# Patient Record
Sex: MALE | Race: WHITE
[De-identification: names, ages, dates, MRNs, and addresses within clinical notes are randomized per-mention and may not be internally consistent; named-entity substitution may affect disease eponyms.]

---

## 2019-11-05 LAB
ALBUMIN SERPL-MCNC: 3.9 G/DL (ref 3.5–5)
ALBUMIN/GLOB SERPL: 1.1 {RATIO} (ref 0.8–2)
ALP SERPL-CCNC: 54 IU/L (ref 40–150)
ALT SERPL-CCNC: 24 IU/L (ref 0–55)
ANION GAP SERPL CALC-SCNC: 14.8 MMOL/L (ref 8–16)
BASOPHILS # BLD AUTO: 0.1 10*3/UL (ref 0–0.1)
BASOPHILS NFR BLD AUTO: 0.7 % (ref 0–1)
BUN SERPL-MCNC: 12 MG/DL (ref 7–26)
BUN/CREAT SERPL: 13 (ref 6–25)
CALCIUM SERPL-MCNC: 10.1 MG/DL (ref 8.4–10.2)
CHLORIDE SERPL-SCNC: 100 MMOL/L (ref 98–107)
CO2 SERPL-SCNC: 24 MMOL/L (ref 22–29)
DEPRECATED NEUTROPHILS # BLD AUTO: 5.8 10*3/UL (ref 2.1–6.9)
EGFRCR SERPLBLD CKD-EPI 2021: > 60 ML/MIN (ref 60–?)
EOSINOPHIL # BLD AUTO: 0.1 10*3/UL (ref 0–0.4)
EOSINOPHIL NFR BLD AUTO: 1.5 % (ref 0–6)
ERYTHROCYTE [DISTWIDTH] IN CORD BLOOD: 13.5 % (ref 11.7–14.4)
GLOBULIN PLAS-MCNC: 3.5 G/DL (ref 2.3–3.5)
GLUCOSE SERPLBLD-MCNC: 125 MG/DL (ref 74–118)
HCT VFR BLD AUTO: 41.1 % (ref 38.2–49.6)
HGB BLD-MCNC: 13.7 G/DL (ref 14–18)
LYMPHOCYTES # BLD: 2.7 10*3/UL (ref 1–3.2)
LYMPHOCYTES NFR BLD AUTO: 28 % (ref 18–39.1)
MCH RBC QN AUTO: 29.9 PG (ref 28–32)
MCHC RBC AUTO-ENTMCNC: 33.3 G/DL (ref 31–35)
MCV RBC AUTO: 89.7 FL (ref 81–99)
MONOCYTES # BLD AUTO: 0.8 10*3/UL (ref 0.2–0.8)
MONOCYTES NFR BLD AUTO: 8 % (ref 4.4–11.3)
NEUTS SEG NFR BLD AUTO: 61.4 % (ref 38.7–80)
PLATELET # BLD AUTO: 258 X10E3/UL (ref 140–360)
POTASSIUM SERPL-SCNC: 3.8 MMOL/L (ref 3.5–5.1)
RBC # BLD AUTO: 4.58 X10E6/UL (ref 4.3–5.7)
SODIUM SERPL-SCNC: 135 MMOL/L (ref 136–145)

## 2019-11-07 ENCOUNTER — HOSPITAL ENCOUNTER (OUTPATIENT)
Dept: HOSPITAL 88 - CATH LAB | Age: 69
Discharge: HOME | End: 2019-11-07
Attending: INTERNAL MEDICINE
Payer: MEDICARE

## 2019-11-07 VITALS — SYSTOLIC BLOOD PRESSURE: 143 MMHG | DIASTOLIC BLOOD PRESSURE: 77 MMHG

## 2019-11-07 VITALS — DIASTOLIC BLOOD PRESSURE: 73 MMHG | SYSTOLIC BLOOD PRESSURE: 143 MMHG

## 2019-11-07 VITALS — DIASTOLIC BLOOD PRESSURE: 80 MMHG | SYSTOLIC BLOOD PRESSURE: 146 MMHG

## 2019-11-07 VITALS — SYSTOLIC BLOOD PRESSURE: 162 MMHG | DIASTOLIC BLOOD PRESSURE: 77 MMHG

## 2019-11-07 VITALS — SYSTOLIC BLOOD PRESSURE: 145 MMHG | DIASTOLIC BLOOD PRESSURE: 82 MMHG

## 2019-11-07 VITALS — SYSTOLIC BLOOD PRESSURE: 138 MMHG | DIASTOLIC BLOOD PRESSURE: 78 MMHG

## 2019-11-07 VITALS — DIASTOLIC BLOOD PRESSURE: 77 MMHG | SYSTOLIC BLOOD PRESSURE: 154 MMHG

## 2019-11-07 VITALS — DIASTOLIC BLOOD PRESSURE: 77 MMHG | SYSTOLIC BLOOD PRESSURE: 155 MMHG

## 2019-11-07 VITALS — HEIGHT: 77 IN | BODY MASS INDEX: 24.21 KG/M2 | WEIGHT: 205 LBS

## 2019-11-07 VITALS — SYSTOLIC BLOOD PRESSURE: 142 MMHG | DIASTOLIC BLOOD PRESSURE: 77 MMHG

## 2019-11-07 VITALS — SYSTOLIC BLOOD PRESSURE: 142 MMHG | DIASTOLIC BLOOD PRESSURE: 88 MMHG

## 2019-11-07 DIAGNOSIS — E78.5: ICD-10-CM

## 2019-11-07 DIAGNOSIS — Z79.02: ICD-10-CM

## 2019-11-07 DIAGNOSIS — I25.10: Primary | ICD-10-CM

## 2019-11-07 DIAGNOSIS — Z79.84: ICD-10-CM

## 2019-11-07 DIAGNOSIS — Z82.49: ICD-10-CM

## 2019-11-07 DIAGNOSIS — Z01.812: ICD-10-CM

## 2019-11-07 DIAGNOSIS — Z72.0: ICD-10-CM

## 2019-11-07 DIAGNOSIS — I10: ICD-10-CM

## 2019-11-07 DIAGNOSIS — Z95.5: ICD-10-CM

## 2019-11-07 DIAGNOSIS — Z91.041: ICD-10-CM

## 2019-11-07 DIAGNOSIS — E13.69: ICD-10-CM

## 2019-11-07 DIAGNOSIS — Z79.82: ICD-10-CM

## 2019-11-07 DIAGNOSIS — I70.213: ICD-10-CM

## 2019-11-07 DIAGNOSIS — R94.39: ICD-10-CM

## 2019-11-07 PROCEDURE — 99153 MOD SED SAME PHYS/QHP EA: CPT

## 2019-11-07 PROCEDURE — 75716 ARTERY X-RAYS ARMS/LEGS: CPT

## 2019-11-07 PROCEDURE — 75625 CONTRAST EXAM ABDOMINL AORTA: CPT

## 2019-11-07 PROCEDURE — 80053 COMPREHEN METABOLIC PANEL: CPT

## 2019-11-07 PROCEDURE — 99152 MOD SED SAME PHYS/QHP 5/>YRS: CPT

## 2019-11-07 PROCEDURE — 36247 INS CATH ABD/L-EXT ART 3RD: CPT

## 2019-11-07 PROCEDURE — 85025 COMPLETE CBC W/AUTO DIFF WBC: CPT

## 2019-11-07 PROCEDURE — 36415 COLL VENOUS BLD VENIPUNCTURE: CPT

## 2019-11-07 PROCEDURE — 93458 L HRT ARTERY/VENTRICLE ANGIO: CPT

## 2019-11-07 NOTE — NUR
1430 Pt meets DC criteria. Rt Groin assessed for s/s of complication and presence of 
hematoma. Skin warm, dry, no discolor, and pulses present. IV removed from . Distal tip 
appears intact. VS WNL. Pt denies pain, sob, or need at this time. Family at XXXXX. Review 
of discharge paperwork and follow up instructions. verbalized understanding. Pt to 
wheelchair and transported to front of hospital. Transferred to private vehicle under own 
strength w/o incident with DC paperwork in hand. -ds/rn

## 2019-11-07 NOTE — NUR
1025 sheath pull in progress 4fr right groin manual pressure by [facility] Cath lab Technologist 
Anamika JAIN on standby. No gross issues pain pallor pressure or dysrhythmia. Lizy patch and 
2x2 with tegederm applied PPx2 present palpable post.

1025 B/p150/77 Hr 69 Resp 18 100% RA

1030 B/p156/77 Hr 60 Resp 16 100% RA

1045 b/p 138/75 Hr 61 Resp 22 100% RA

Sheath site stable and pull completed with Manual pressure for 20mins No gross issues pain 
pallor pressure or dysrhythmia ds/rn

## 2019-11-07 NOTE — NUR
1017 Received in Rm #10,bedside report received from Alejandro JAIN. Alert oriented and 
appropriate, PERRLA, respirations even and unlabored to room air. Pulses x4 extremities 
equal and strong. Pedal pulses PT/DP x2 right only palpable left Doppler and marked. Cap 
fill brisk < 3 sec. sheath pull completed Shante, Cath lab technologist at 1045 dc home after 
down time at 1445pm dc paper prepared Md spoke with family probable surgical evaluation 
Family to make appt.



Skin warm and dry integrity appears D/I. IV 20g to left FA  presents healthy w/o s/s of 
infiltration or complaint. Abdomen soft and supple. pt offered toileting, denies need to 
urinate or defecate. No personal affects with patient.  Family wife Sadia [phone] Pt 
and family verbalizes understanding of POC. 



Currently w/o complaint of pain or need.ds/rn

## 2020-11-27 ENCOUNTER — HOSPITAL ENCOUNTER (EMERGENCY)
Dept: HOSPITAL 88 - ER | Age: 70
Discharge: HOME | End: 2020-11-27
Payer: MEDICARE

## 2020-11-27 VITALS — BODY MASS INDEX: 24.21 KG/M2 | HEIGHT: 77 IN | WEIGHT: 205 LBS

## 2020-11-27 DIAGNOSIS — Z95.5: ICD-10-CM

## 2020-11-27 DIAGNOSIS — M25.512: Primary | ICD-10-CM

## 2020-11-27 DIAGNOSIS — Z79.02: ICD-10-CM

## 2020-11-27 DIAGNOSIS — I10: ICD-10-CM

## 2020-11-27 PROCEDURE — 99283 EMERGENCY DEPT VISIT LOW MDM: CPT

## 2020-11-27 PROCEDURE — 93005 ELECTROCARDIOGRAM TRACING: CPT

## 2020-11-27 NOTE — EMERGENCY DEPARTMENT NOTE
History of Present Illnes


History of Present Illness


Chief Complaint:  Extremity Trauma/Pain


History of Present Illness


This is a 69 year old  male Chief Complaint Comment Patient in from 

home with complaints of left shoulder pain and decreased range of motion that 

started Wednesday morning when he caught himself from slipping down the stairs 

with the left arm. Patient denies pain in triage but states that it is painful 

when he moves the left arm and that he is unable to raise it over his head. 

Patient denies hitting his head. Reports being on plavix. No previous shoulder 

surgery.


Historian:  Patient


Arrival Mode:  Car


 Required:  No


Onset (how long ago):  day(s) (3)


Location:  L shoulder


Quality:  Sharp


Radiation:  Reports non-radiation


Severity:  moderate


Onset quality:  sudden


Duration (how long):  day(s) (3)


Timing of current episode:  constant


Progression:  unchanged


Chronicity:  new


Context:  Denies recent illness, Denies recent surgery


Relieving factors:  none


Exacerbating factors:  none


Associated symptoms:  Reports denies other symptoms


Treatments prior to arrival:  none





Past Medical/Family History


Physician Review


I have reviewed the patient's past medical and family history.  Any updates have

been documented here.





Past Medical History


Recent Fever:  No


Clinical Suspicion of Infectio:  No


New/Unexplained Change in Ment:  No


Past Medical History:  Hypertension


Other Medical History:  


CAROTID STENTS, CARDIAC STENTS


Past Surgical History:  PCI


Other Surgery:  


Left hip fracture repair (ORIF)





Social History


Smoking Cessation:  Never Smoker


Counseling Performed:  No


Alcohol Use:  Occasional


Any Illegal Drug Use:  No


Physically hurt or threatened:  No





Other


Any Pre-Existing Lines (PICC,:  No





Review of Systems


Review of Systems


Constitutional:  Reports no symptoms


EENTM:  Reports no symptoms


Cardiovascular:  Reports no symptoms


Respiratory:  Reports no symptoms


Gastrointestinal:  Reports no symptoms


Genitourinary:  Reports no symptoms


Musculoskeletal:  Reports as per HPI, Reports joint pain (L shoulder, L hip)


Integumentary:  Reports no symptoms


Neurological:  Reports no symptoms


Psychological:  Reports no symptoms


Endocrine:  Reports no symptoms


Hematological/Lymphatic:  Reports no symptoms





Physical Exam


Related Data


Allergies:  


Coded Allergies:  


     iodine (Verified  Allergy, Unknown, PATIENT REPORTS RASH WITH TOPICAL 

IODINE, 11/5/19)


Triage Vital Signs





Vital Signs








  Date Time  Temp Pulse Resp B/P (MAP) Pulse Ox O2 Delivery O2 Flow Rate FiO2


 


11/27/20 06:43 97.8 92 17 152/85 99   








Vital signs reviewed:  Yes





Physical Exam


CONSTITUTIONAL





Constitutional:  Present well-developed, Present well-nourished


HENT


HENT:  Present normocephalic, Present atraumatic, Present oropharynx 

clear/moist, Present nose normal


HENT L/R:  Present left ext ear normal, Present right ext ear normal


EYES





Eyes:  Reports PERRL, Reports conjunctivae normal


NECK


Neck:  Present ROM normal


PULMONARY


Pulmonary:  Present effort normal, Present breath sounds normal


CARDIOVASCULAR





Cardiovascular:  Present regular rhythm, Present heart sounds normal, Present 

capillary refill normal, Present normal rate


GASTROINTESTINAL





Abdominal:  Present soft, Present nontender, Present bowel sounds normal


GENITOURINARY





Genitourinary:  Present exam deferred


SKIN


Skin:  Present warm, Present dry


MUSCULOSKELETAL





Musculoskeletal:  Present other (No pain at rest. Pain with ROM. Difficulty 

abducting L shoulder 2/2 pain); 


   Absent deformity, Absent tenderness


NEUROLOGICAL





Neurological:  Present alert, Present oriented x 3, Present no gross motor or 

sensory deficits


PSYCHOLOGICAL


Psychological:  Present mood/affect normal, Present judgement normal





Results


Imaging


Imaging results reviewed:  Yes





Diagnostics Tests


Diagnostic test(s) reviewed:  Yes





Procedures


12 Lead ECG Interpretation


ECG Interpretation :  


   :  Interpreted by ED physician


   Date:  Nov 27, 2020


   Prior ECG tracings:  reviewed


   Rhythm:  sinus rhythm


   Rate:  normal


   QRS axis:  normal


   ST segment flattening:  I, aVL


   T wave inversion:  aVL





Assessment & Plan


Medical Decision Making


[facility]


69 y.o M PMH sig for cardiac stents presents for L shoulder pain after catching 

himself on stairs 2 days ago. Exam sows no deformity, neurovascularly intact. 

Difficult to abduct arm 2/2 pain. X-rays L shoulder and L hip ordered. EKG 

appears baseline. X-rays benign. Doubt emergent process at this time. I 

discussed results patient as well as expected disease time course and 

management. They will follow up with their primary care provider or return to 

the emergency department for new or worsening symptoms. Patient's appropriate 

for discharge.





Reassessment


Reassessment time:  08:17


Reassessment


Well appearing, NAD





Assessment & Plan


Final Impression:  


(1) Shoulder pain


Depart Disposition:  HOME, SELF-CARE


Last Vital Signs











  Date Time  Temp Pulse Resp B/P (MAP) Pulse Ox O2 Delivery O2 Flow Rate FiO2


 


11/27/20 06:43 97.8 92 17 152/85 99   








Home Meds


Reported Medications


Pravastatin Sodium (PRAVASTATIN SODIUM) 40 Mg Tablet, 40 MG PO HS


   11/5/19


Glimepiride (GLIMEPIRIDE) 2 Mg Tablet, 0.5 TAB PO DAILY, TAB


   11/5/19


Ezetimibe (ZETIA) 10 Mg Tablet, 10 MG PO DAILY, #30 TAB


   11/5/19


Nifedipine (NIFEDIPINE ER) 30 Mg Tablet.er, 1 TAB PO BID


   6/23/16


Aspirin (ASPIR 81) 81 Mg Tablet.dr, 81 MG PO DAILY


   6/23/16


Losartan/Hydrochlorothiazide (LOSARTAN-HCTZ 100-12.5 MG TAB) 1 Each Tablet, 1 

TAB PO DAILY


   6/23/16


Metoprolol Tartrate (METOPROLOL TARTRATE) 50 Mg Tablet, 50 MG PO DAILY, TAB


   6/23/16


Metformin Hcl (METFORMIN HCL) 850 Mg Tablet, 850 MG PO BID, #30 TAB


   6/23/16


Meloxicam (MELOXICAM) 7.5 Mg Tablet, 7.5 MG PO DAILY, #30 TAB


   6/23/16


Clopidogrel Bisulfate (CLOPIDOGREL) 75 Mg Tablet, 75 MG PO DAILY, #30 TAB


   6/23/16


Medications in the ED





Lidocaine 1 ea DAILY TP  Last administered on 11/27/20at 07:07; Admin Dose 1 EA;

 Start 11/27/20 at 09:00;  Stop 12/27/20 at 08:59


Acetaminophen 975 mg ONCE  ONCE PO  Last administered on 11/27/20at 07:07; Admin

Dose 975 MG;  Start 11/27/20 at 07:00;  Stop 11/27/20 at 07:15;  Status DC











PHILLIP ZAMORA MD          Nov 27, 2020 07:26

## 2020-11-27 NOTE — DIAGNOSTIC IMAGING REPORT
Hip complete



Indication: Fall 

^L shoulder pain



Technique: AP and frogleg views of left hip obtained. Images of a right femoral

intramedullary lakeshia were also obtained.



Comparison: None



Findings:



Left hip remains properly located.   The cortex appears intact throughout. 

Trochanters appear intact.  Minimal spurring evident from the acetabulum.  

Adjacent pubic rami appear intact.  Lower lumbar spine demonstrates no

abnormalities.  



Right femoral intramedullary lakeshia and pins have been placed without evidence of

fracture, dislocation, or lucency to suggest hardware failure. There is a

healed intertrochanteric fracture of the right femur. 



Diffuse vascular calcifications of the pelvis and lower extremities.



IMPRESSION:



Left hip properly located.  No convincing evidence for fracture. No fracture or

dislocation of the right femur. No evidence of right femoral hardware failure.



Signed by: Dr. Danial Marroquin MD on 11/27/2020 7:51 AM

## 2020-11-27 NOTE — XMS REPORT
Continuity of Care Document

                             Created on: 2020



OMARI WISEMAN

External Reference #: [id number]

: 1950

Sex: Male



Demographics





                          Address                   [address]

[place]  [address]

 

                          Home Phone                [phone]

 

                          Preferred Language        English

 

                          Marital Status            Unknown

 

                          Oriental orthodox Affiliation     Unknown

 

                          Race                      Unknown

 

                          Ethnic Group              Unknown





Author





                          Author                    [facility]

 

                          Organization              [facility]

 

                          Address                   [address]

[place]  [address]



 

                          Phone                     Unavailable







Care Team Providers





                    Care Team Member Name Role                Phone

 

                    DG YAO M.D. Attphys             Unavailable







Problems





           Condition Name Condition Details Condition Category Status     Onset 

Date Resolution

Date            Last Treatment Date Treating Clinician Comments        Source

 

                PVD (peripheral vascular disease) PVD (peripheral vascular disea

se) Problem         

Active                                                            [facility]

 

       3-vessel CAD 3-vessel CAD Problem Active                                 

   [facility] 

[facility]

 

                          Asymptomatic bilateral carotid artery stenosis Asympto

matic bilateral carotid 

artery stenosis Problem Active                                          [facility]







Allergies, Adverse Reactions, Alerts

This patient has no known allergies or adverse reactions.



Social History





                Smoking Status  Start Date      Stop Date       Source

 

                Current every day smoker                                 [facility]







Medications





             Ordered Medication Name Filled Medication Name Start Date   Stop Da

te    Current 

Medication? Ordering Clinician Indication Dosage     Frequency  Signature (SIG) 

Comments                  Components                Source

 

     Baby Aspirin 81 MG CHEW Baby Aspirin 81 MG CHEW           Yes              

                       [facility]

 

     Plavix 75 MG Oral Tablet Plavix 75 MG Oral Tablet           Yes            

                         

[facility]

 

     Zetia 10 MG Oral Tablet Zetia 10 MG Oral Tablet           Yes              

                       [facility]

 

     Glimepiride TABS Glimepiride TABS           Yes                            

         [facility] 

[facility]

 

                    hydroCHLOROthiazide 12.5 MG Oral Tablet hydroCHLOROthiazide 

12.5 MG Oral Tablet 

              Yes                                                     [facility]

 

             Losartan Potassium 100 MG Oral Tablet Losartan Potassium 100 MG Ora

l Tablet                           

Yes                                                             [facility]

 

     Meloxicam 7.5 MG Oral Tablet Meloxicam 7.5 MG Oral Tablet           Yes    

                                 

[facility]

 

                          metFORMIN HCl  MG Oral Tablet Extended Release 2

4 Hour metFORMIN HCl ER 

750 MG Oral Tablet Extended Release 24 Hour             Yes                     

                        [facility]

 

                          Toprol XL 50 MG Oral Tablet Extended Release 24 Hour T

oprol XL 50 MG Oral Tablet

Extended Release 24 Hour             Yes                                        

     [facility]

 

     NIFEdipine CR 30 MG TB24 NIFEdipine CR 30 MG TB24           Yes            

                         

[facility]

 

     Pravachol 40 MG Oral Tablet Pravachol 40 MG Oral Tablet           Yes      

                               

[facility]

 

     Flomax 0.4 MG Oral Capsule Flomax 0.4 MG Oral Capsule           Yes        

                             

[facility]







Vital Signs





             Vital Name   Observation Time Observation Value Comments     Source

 

             BP Systolic  2019 11:41:00 137 mm[Hg]                [facility]

 

             BP Diastolic 2019 11:41:00 79 mm[Hg]                 [facility]

 

             Height       2019 11:41:00 77 [in_us]                [facility]

 

             Weight       2019 11:41:00 207 [lb_av]               [facility]

 

             Body Mass Index Calculated 2019 11:41:00 24.55 kg/m2         

      [facility]

 [facility]







Procedures

This patient has no known procedures.



Encounters





             Start Date/Time End Date/Time Encounter Type [facility] Type [facility]

[facility]   Care Department Encounter ID    Source

 

             2019 11:15:00 2019 11:15:00 Appointment; DG YAO M.D. NAHAS, CESAR, M.D. [facility]             Cardiothoracic & Vascular Surgery [facility]

 [id number]        

[facility]







Results

This patient has no known results.

## 2020-11-27 NOTE — DIAGNOSTIC IMAGING REPORT
Shoulder complete



CPT code:  84273



Indication: Fall, twisting injury 

^L shoulder pain

^[id number]

^9835



Technique: Internal, external and Y-view of left shoulder obtained.



Comparison: None.



Findings:



There is no current dislocation.  No evidence of fracture involving the humeral

head.  Visualized proximal shaft is intact.  The clavicle is intact.  The

clavicle and acromion are properly aligned. No fracture evident involving the

visualized portion of the scapula. No significant degenerative changes.

Visualized chest is unremarkable.



IMPRESSION:



No evidence of acute fracture or dislocation involving the shoulder.



Signed by: Dr. Danial Marroquin MD on 11/27/2020 7:47 AM

## 2021-04-16 LAB
ALBUMIN SERPL-MCNC: 4 G/DL (ref 3.5–5)
ALBUMIN/GLOB SERPL: 1.1 {RATIO} (ref 0.8–2)
ALP SERPL-CCNC: 57 IU/L (ref 40–150)
ALT SERPL-CCNC: 28 IU/L (ref 0–55)
ANION GAP SERPL CALC-SCNC: 13.3 MMOL/L (ref 8–16)
BASOPHILS # BLD AUTO: 0.1 10*3/UL (ref 0–0.1)
BASOPHILS NFR BLD AUTO: 0.9 % (ref 0–1)
BUN SERPL-MCNC: 14 MG/DL (ref 7–26)
BUN/CREAT SERPL: 14 (ref 6–25)
CALCIUM SERPL-MCNC: 9.3 MG/DL (ref 8.4–10.2)
CHLORIDE SERPL-SCNC: 103 MMOL/L (ref 98–107)
CO2 SERPL-SCNC: 26 MMOL/L (ref 22–29)
DEPRECATED INR PLAS: 0.95
DEPRECATED NEUTROPHILS # BLD AUTO: 5.3 10*3/UL (ref 2.1–6.9)
EGFRCR SERPLBLD CKD-EPI 2021: > 60 ML/MIN (ref 60–?)
EOSINOPHIL # BLD AUTO: 0.2 10*3/UL (ref 0–0.4)
EOSINOPHIL NFR BLD AUTO: 1.8 % (ref 0–6)
ERYTHROCYTE [DISTWIDTH] IN CORD BLOOD: 13.6 % (ref 11.7–14.4)
GLOBULIN PLAS-MCNC: 3.7 G/DL (ref 2.3–3.5)
GLUCOSE SERPLBLD-MCNC: 163 MG/DL (ref 74–118)
HCT VFR BLD AUTO: 42.9 % (ref 38.2–49.6)
HGB BLD-MCNC: 14.3 G/DL (ref 14–18)
LYMPHOCYTES # BLD: 2.2 10*3/UL (ref 1–3.2)
LYMPHOCYTES NFR BLD AUTO: 26 % (ref 18–39.1)
MCH RBC QN AUTO: 29.7 PG (ref 28–32)
MCHC RBC AUTO-ENTMCNC: 33.3 G/DL (ref 31–35)
MCV RBC AUTO: 89 FL (ref 81–99)
MONOCYTES # BLD AUTO: 0.8 10*3/UL (ref 0.2–0.8)
MONOCYTES NFR BLD AUTO: 9 % (ref 4.4–11.3)
NEUTS SEG NFR BLD AUTO: 61.8 % (ref 38.7–80)
PLATELET # BLD AUTO: 253 X10E3/UL (ref 140–360)
POTASSIUM SERPL-SCNC: 4.3 MMOL/L (ref 3.5–5.1)
PROTHROMBIN TIME: 13.3 SECONDS (ref 11.9–14.5)
RBC # BLD AUTO: 4.82 X10E6/UL (ref 4.3–5.7)
SODIUM SERPL-SCNC: 138 MMOL/L (ref 136–145)

## 2021-04-19 ENCOUNTER — HOSPITAL ENCOUNTER (OUTPATIENT)
Dept: HOSPITAL 88 - CATH LAB | Age: 71
Setting detail: OBSERVATION
LOS: 1 days | Discharge: HOME | End: 2021-04-20
Attending: INTERNAL MEDICINE | Admitting: INTERNAL MEDICINE
Payer: MEDICARE

## 2021-04-19 VITALS — SYSTOLIC BLOOD PRESSURE: 129 MMHG | DIASTOLIC BLOOD PRESSURE: 74 MMHG

## 2021-04-19 VITALS — DIASTOLIC BLOOD PRESSURE: 82 MMHG | SYSTOLIC BLOOD PRESSURE: 180 MMHG

## 2021-04-19 VITALS — DIASTOLIC BLOOD PRESSURE: 81 MMHG | SYSTOLIC BLOOD PRESSURE: 148 MMHG

## 2021-04-19 VITALS — SYSTOLIC BLOOD PRESSURE: 134 MMHG | DIASTOLIC BLOOD PRESSURE: 88 MMHG

## 2021-04-19 VITALS — WEIGHT: 203 LBS | BODY MASS INDEX: 24.72 KG/M2 | HEIGHT: 76 IN

## 2021-04-19 VITALS — SYSTOLIC BLOOD PRESSURE: 154 MMHG | DIASTOLIC BLOOD PRESSURE: 78 MMHG

## 2021-04-19 VITALS — SYSTOLIC BLOOD PRESSURE: 170 MMHG | DIASTOLIC BLOOD PRESSURE: 79 MMHG

## 2021-04-19 VITALS — SYSTOLIC BLOOD PRESSURE: 187 MMHG | DIASTOLIC BLOOD PRESSURE: 89 MMHG

## 2021-04-19 VITALS — SYSTOLIC BLOOD PRESSURE: 147 MMHG | DIASTOLIC BLOOD PRESSURE: 78 MMHG

## 2021-04-19 VITALS — DIASTOLIC BLOOD PRESSURE: 88 MMHG | SYSTOLIC BLOOD PRESSURE: 167 MMHG

## 2021-04-19 VITALS — DIASTOLIC BLOOD PRESSURE: 79 MMHG | SYSTOLIC BLOOD PRESSURE: 163 MMHG

## 2021-04-19 VITALS — DIASTOLIC BLOOD PRESSURE: 79 MMHG | SYSTOLIC BLOOD PRESSURE: 143 MMHG

## 2021-04-19 VITALS — SYSTOLIC BLOOD PRESSURE: 156 MMHG | DIASTOLIC BLOOD PRESSURE: 77 MMHG

## 2021-04-19 VITALS — SYSTOLIC BLOOD PRESSURE: 176 MMHG | DIASTOLIC BLOOD PRESSURE: 80 MMHG

## 2021-04-19 VITALS — DIASTOLIC BLOOD PRESSURE: 78 MMHG | SYSTOLIC BLOOD PRESSURE: 147 MMHG

## 2021-04-19 VITALS — SYSTOLIC BLOOD PRESSURE: 147 MMHG | DIASTOLIC BLOOD PRESSURE: 76 MMHG

## 2021-04-19 VITALS — SYSTOLIC BLOOD PRESSURE: 177 MMHG | DIASTOLIC BLOOD PRESSURE: 82 MMHG

## 2021-04-19 VITALS — SYSTOLIC BLOOD PRESSURE: 142 MMHG | DIASTOLIC BLOOD PRESSURE: 78 MMHG

## 2021-04-19 VITALS — SYSTOLIC BLOOD PRESSURE: 168 MMHG | DIASTOLIC BLOOD PRESSURE: 95 MMHG

## 2021-04-19 VITALS — DIASTOLIC BLOOD PRESSURE: 88 MMHG | SYSTOLIC BLOOD PRESSURE: 139 MMHG

## 2021-04-19 VITALS — DIASTOLIC BLOOD PRESSURE: 72 MMHG | SYSTOLIC BLOOD PRESSURE: 137 MMHG

## 2021-04-19 VITALS — SYSTOLIC BLOOD PRESSURE: 133 MMHG | DIASTOLIC BLOOD PRESSURE: 72 MMHG

## 2021-04-19 VITALS — SYSTOLIC BLOOD PRESSURE: 160 MMHG | DIASTOLIC BLOOD PRESSURE: 73 MMHG

## 2021-04-19 DIAGNOSIS — E78.5: ICD-10-CM

## 2021-04-19 DIAGNOSIS — Z72.0: ICD-10-CM

## 2021-04-19 DIAGNOSIS — Z87.442: ICD-10-CM

## 2021-04-19 DIAGNOSIS — Z98.61: ICD-10-CM

## 2021-04-19 DIAGNOSIS — Z82.49: ICD-10-CM

## 2021-04-19 DIAGNOSIS — Z20.822: ICD-10-CM

## 2021-04-19 DIAGNOSIS — I25.10: ICD-10-CM

## 2021-04-19 DIAGNOSIS — Z01.812: ICD-10-CM

## 2021-04-19 DIAGNOSIS — E11.9: ICD-10-CM

## 2021-04-19 DIAGNOSIS — I70.213: Primary | ICD-10-CM

## 2021-04-19 DIAGNOSIS — I71.9: ICD-10-CM

## 2021-04-19 DIAGNOSIS — I77.9: ICD-10-CM

## 2021-04-19 DIAGNOSIS — I10: ICD-10-CM

## 2021-04-19 PROCEDURE — 75630 X-RAY AORTA LEG ARTERIES: CPT

## 2021-04-19 PROCEDURE — 99153 MOD SED SAME PHYS/QHP EA: CPT

## 2021-04-19 PROCEDURE — 37224: CPT

## 2021-04-19 PROCEDURE — 37227: CPT

## 2021-04-19 PROCEDURE — 36415 COLL VENOUS BLD VENIPUNCTURE: CPT

## 2021-04-19 PROCEDURE — 85347 COAGULATION TIME ACTIVATED: CPT

## 2021-04-19 PROCEDURE — 99152 MOD SED SAME PHYS/QHP 5/>YRS: CPT

## 2021-04-19 PROCEDURE — 36247 INS CATH ABD/L-EXT ART 3RD: CPT

## 2021-04-19 PROCEDURE — 37220: CPT

## 2021-04-19 PROCEDURE — 80053 COMPREHEN METABOLIC PANEL: CPT

## 2021-04-19 PROCEDURE — 85610 PROTHROMBIN TIME: CPT

## 2021-04-19 PROCEDURE — 85025 COMPLETE CBC W/AUTO DIFF WBC: CPT

## 2021-04-19 PROCEDURE — 82948 REAGENT STRIP/BLOOD GLUCOSE: CPT

## 2021-04-19 RX ADMIN — INSULIN HUMAN SCH UNIT: 100 INJECTION, SOLUTION PARENTERAL at 21:00

## 2021-04-20 VITALS — SYSTOLIC BLOOD PRESSURE: 145 MMHG | DIASTOLIC BLOOD PRESSURE: 87 MMHG

## 2021-04-20 VITALS — DIASTOLIC BLOOD PRESSURE: 84 MMHG | SYSTOLIC BLOOD PRESSURE: 163 MMHG

## 2021-04-20 VITALS — SYSTOLIC BLOOD PRESSURE: 177 MMHG | DIASTOLIC BLOOD PRESSURE: 85 MMHG

## 2021-04-20 VITALS — DIASTOLIC BLOOD PRESSURE: 77 MMHG | SYSTOLIC BLOOD PRESSURE: 155 MMHG

## 2021-04-20 VITALS — DIASTOLIC BLOOD PRESSURE: 84 MMHG | SYSTOLIC BLOOD PRESSURE: 170 MMHG

## 2021-04-20 VITALS — SYSTOLIC BLOOD PRESSURE: 141 MMHG | DIASTOLIC BLOOD PRESSURE: 76 MMHG

## 2021-04-20 RX ADMIN — INSULIN HUMAN SCH UNIT: 100 INJECTION, SOLUTION PARENTERAL at 16:11

## 2021-04-20 RX ADMIN — INSULIN HUMAN SCH UNIT: 100 INJECTION, SOLUTION PARENTERAL at 07:30

## 2021-04-20 RX ADMIN — INSULIN HUMAN SCH UNIT: 100 INJECTION, SOLUTION PARENTERAL at 12:05

## 2023-10-18 ENCOUNTER — HOSPITAL ENCOUNTER (EMERGENCY)
Dept: HOSPITAL 88 - ER | Age: 73
LOS: 1 days | Discharge: HOME | End: 2023-10-19
Payer: COMMERCIAL

## 2023-10-18 VITALS — BODY MASS INDEX: 23.14 KG/M2 | WEIGHT: 200 LBS | HEIGHT: 78 IN
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
## 2023-10-18 DIAGNOSIS — Z95.5: ICD-10-CM
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
## 2023-10-18 DIAGNOSIS — M25.512: ICD-10-CM

## 2023-10-18 DIAGNOSIS — S00.83XA: ICD-10-CM
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
## 2023-10-18 DIAGNOSIS — S22.32XA: Primary | ICD-10-CM

## 2023-10-18 DIAGNOSIS — I25.10: ICD-10-CM

## 2023-10-18 DIAGNOSIS — I10: ICD-10-CM

## 2023-10-18 DIAGNOSIS — R94.31: ICD-10-CM

## 2023-10-18 PROCEDURE — 72125 CT NECK SPINE W/O DYE: CPT

## 2023-10-18 PROCEDURE — 93005 ELECTROCARDIOGRAM TRACING: CPT

## 2023-10-18 PROCEDURE — 99284 EMERGENCY DEPT VISIT MOD MDM: CPT

## 2023-10-18 PROCEDURE — 70450 CT HEAD/BRAIN W/O DYE: CPT

## 2023-10-18 PROCEDURE — 71250 CT THORAX DX C-: CPT

## 2023-10-19 VITALS — OXYGEN SATURATION: 97 %

## 2025-01-17 ENCOUNTER — HOSPITAL ENCOUNTER (OUTPATIENT)
Dept: HOSPITAL 88 - DX | Age: 75
End: 2025-01-17
Attending: FAMILY MEDICINE
Payer: MEDICARE

## 2025-01-17 DIAGNOSIS — E11.9: ICD-10-CM

## 2025-01-17 DIAGNOSIS — S72.92XA: Primary | ICD-10-CM

## 2025-01-17 PROCEDURE — 77080 DXA BONE DENSITY AXIAL: CPT
